# Patient Record
Sex: FEMALE | Race: WHITE | NOT HISPANIC OR LATINO | Employment: FULL TIME | URBAN - METROPOLITAN AREA
[De-identification: names, ages, dates, MRNs, and addresses within clinical notes are randomized per-mention and may not be internally consistent; named-entity substitution may affect disease eponyms.]

---

## 2018-11-13 ENCOUNTER — OFFICE VISIT (OUTPATIENT)
Dept: URGENT CARE | Facility: CLINIC | Age: 33
End: 2018-11-13

## 2018-11-13 VITALS
HEIGHT: 66 IN | DIASTOLIC BLOOD PRESSURE: 72 MMHG | HEART RATE: 70 BPM | TEMPERATURE: 98 F | OXYGEN SATURATION: 98 % | RESPIRATION RATE: 18 BRPM | SYSTOLIC BLOOD PRESSURE: 115 MMHG | BODY MASS INDEX: 16.88 KG/M2 | WEIGHT: 105 LBS

## 2018-11-13 DIAGNOSIS — F19.10 SUBSTANCE ABUSE: ICD-10-CM

## 2018-11-13 DIAGNOSIS — R31.9 URINARY TRACT INFECTION WITH HEMATURIA, SITE UNSPECIFIED: Primary | ICD-10-CM

## 2018-11-13 DIAGNOSIS — R30.0 DYSURIA: ICD-10-CM

## 2018-11-13 DIAGNOSIS — N39.0 URINARY TRACT INFECTION WITH HEMATURIA, SITE UNSPECIFIED: Primary | ICD-10-CM

## 2018-11-13 LAB
B-HCG UR QL: NEGATIVE
BILIRUB UR QL STRIP: NEGATIVE
CTP QC/QA: YES
GLUCOSE UR QL STRIP: NEGATIVE
KETONES UR QL STRIP: NEGATIVE
LEUKOCYTE ESTERASE UR QL STRIP: POSITIVE
PH, POC UA: 6.5 (ref 5–8)
POC BLOOD, URINE: POSITIVE
POC NITRATES, URINE: NEGATIVE
PROT UR QL STRIP: POSITIVE
SP GR UR STRIP: 1 (ref 1–1.03)
UROBILINOGEN UR STRIP-ACNC: NORMAL (ref 0.1–1.1)

## 2018-11-13 PROCEDURE — 81003 URINALYSIS AUTO W/O SCOPE: CPT | Mod: QW,S$GLB,, | Performed by: NURSE PRACTITIONER

## 2018-11-13 PROCEDURE — 81025 URINE PREGNANCY TEST: CPT | Mod: S$GLB,,, | Performed by: NURSE PRACTITIONER

## 2018-11-13 PROCEDURE — 99204 OFFICE O/P NEW MOD 45 MIN: CPT | Mod: 25,S$GLB,, | Performed by: NURSE PRACTITIONER

## 2018-11-13 RX ORDER — PHENAZOPYRIDINE HYDROCHLORIDE 100 MG/1
100 TABLET, FILM COATED ORAL 3 TIMES DAILY PRN
Qty: 20 TABLET | Refills: 0 | Status: SHIPPED | OUTPATIENT
Start: 2018-11-13 | End: 2019-11-13

## 2018-11-13 RX ORDER — NITROFURANTOIN 25; 75 MG/1; MG/1
100 CAPSULE ORAL 2 TIMES DAILY
Qty: 14 CAPSULE | Refills: 0 | Status: SHIPPED | OUTPATIENT
Start: 2018-11-13 | End: 2018-11-20

## 2018-11-13 NOTE — PATIENT INSTRUCTIONS
UTI   If your condition worsens or fails to improve we recommend that you receive another evaluation at the ER immediately or contact your PCP to discuss your concerns or return here. You must understand that you've received an urgent care treatment only and that you may be released before all your medical problems are known or treated. You the patient will arrange for followup care as instructed.   If you were prescribed antibiotics, please take them to full completion.    If you had cultures done it will take 3-5 days to result. We will call you with the result.   If you are are female and on BCP use additional methods to prevent pregnancy while on the antibiotics and for one cycle after.   Cranberry juice may help. Get the 100% cranberry juice and mix 4 oz of juice with 4 oz of water and drink this 8 oz glass of liquid once a day.     Urinary Tract Infections in Women    Urinary tract infections (UTIs) are most often caused by bacteria (germs). These bacteria enter the urinary tract. The bacteria may come from outside the body. Or they may travel from the skin outside the rectum or vagina into the urethra. Female anatomy makes it easy for bacteria from the bowel to enter a womans urinary tract, which is the most common source of UTI. This means women develop UTIs more often than men. Pain in or around the urinary tract is a common UTI symptom. But the only way to know for sure if you have a UTI for the healthcare provider to test your urine. The two tests that may be done are the urinalysis and urine culture.  Types of UTIs  · Cystitis: A bladder infection (cystitis) is the most common UTI in women. You may have urgent or frequent urination. You may also have pain, burning when you urinate, and bloody urine.  · Urethritis: This is an inflamed urethra, which is the tube that carries urine from the bladder to outside the body. You may have lower  stomach or back pain. You may also have urgent or frequent urination.  · Pyelonephritis: This is a kidney infection. If not treated, it can be serious and damage your kidneys. In severe cases, you may be hospitalized. You may have a fever and lower back pain.  Medicines to treat a UTI  Most UTIs are treated with antibiotics. These kill the bacteria. The length of time you need to take them depends on the type of infection. It may be as short as 3 days. If you have repeated UTIs, a low-dose antibiotic may be needed for several months. Take antibiotics exactly as directed. Dont stop taking them until all of the medicine is gone. If you stop taking the antibiotic too soon, the infection may not go away, and you may develop a resistance to the antibiotic. This can make it much harder to treat.  Lifestyle changes to treat and prevent UTIs  The lifestyle changes below will help get rid of your UTI. They may also help prevent future UTIs.  · Drink plenty of fluids. This includes water, juice, or other caffeine-free drinks. Fluids help flush bacteria out of your body.  · Empty your bladder. Always empty your bladder when you feel the urge to urinate. And always urinate before going to sleep. Urine that stays in your bladder can lead to infection. Try to urinate before and after sex as well.  · Practice good personal hygiene. Wipe yourself from front to back after using the toilet. This helps keep bacteria from getting into the urethra.  · Use condoms during sex. These help prevent UTIs caused by sexually transmitted bacteria. Also, avoid using spermicides during sex. These can increase the risk of UTIs. Choose other forms of birth control instead. For women who tend to get UTIs after sex, a low-dose of a preventive antibiotic may be used. Be sure to discuss this option with your healthcare provider.  · Follow up with your healthcare provider as directed. He or she may test to make sure the infection has cleared. If needed,  more treatment may be started.  Date Last Reviewed: 1/1/2017  © 0779-6355 [employer]. [address] [place] [address], [place] [address]. All rights reserved. This information is not intended as a substitute for professional medical care. Always follow your healthcare professional's instructions.

## 2018-11-13 NOTE — PROGRESS NOTES
"Subjective:       Patient ID: Nadege Deng is a 33 y.o. female.    Vitals:  height is 5' 6" (1.676 m) and weight is 47.6 kg (105 lb). Her temperature is 97.7 °F (36.5 °C). Her blood pressure is 115/72 and her pulse is 70. Her respiration is 18 and oxygen saturation is 98%.     Chief Complaint: Urinary Tract Infection    Patient lives [place], she woke up yesterday having "exterior vaginal pain when urinating ". Patient states to have blood in urine that does not look like menstrual blood. she admits to using fathers protonix  for acid reflux and is a heroine user. She stated to have taken some AZO yesterday for the pain and it seems to have relieved some pain.      Urinary Tract Infection    This is a new problem. The current episode started yesterday. There has been no fever. She is sexually active. Associated symptoms include frequency, hesitancy, nausea and urgency. Pertinent negatives include no behavior changes, chills, discharge, flank pain, hematuria, possible pregnancy, sweats, vomiting, weight loss, bubble bath use, constipation, rash or withholding. Treatments tried: azo.     Review of Systems   Constitution: Negative for chills, fever and weight loss.   Skin: Negative for itching and rash.   Musculoskeletal: Negative for back pain.   Gastrointestinal: Positive for nausea. Negative for abdominal pain, constipation and vomiting.   Genitourinary: Positive for frequency, hesitancy and urgency. Negative for dysuria, flank pain, genital sores, hematuria, missed menses and non-menstrual bleeding.   All other systems reviewed and are negative.      Objective:      Physical Exam   Constitutional: She is oriented to person, place, and time. Vital signs are normal. She appears well-developed and well-nourished.  Non-toxic appearance. She does not have a sickly appearance. She does not appear ill. No distress.   HENT:   Head: Normocephalic and atraumatic.   Right Ear: Hearing, tympanic membrane, external ear and " ear canal normal.   Left Ear: Hearing, tympanic membrane, external ear and ear canal normal.   Nose: Nose normal. No mucosal edema, rhinorrhea or nasal deformity. No epistaxis.   Mouth/Throat: Uvula is midline, oropharynx is clear and moist and mucous membranes are normal. Tonsils are 1+ on the right. Tonsils are 1+ on the left. No tonsillar exudate.   Eyes: Conjunctivae, EOM and lids are normal. Pupils are equal, round, and reactive to light.   Neck: Trachea normal, normal range of motion, full passive range of motion without pain and phonation normal. Neck supple.   Cardiovascular: Normal rate, regular rhythm, S1 normal, S2 normal, normal heart sounds and normal pulses.   Pulmonary/Chest: Effort normal and breath sounds normal. No respiratory distress. She has no decreased breath sounds. She has no wheezes. She has no rhonchi. She has no rales.   Abdominal: Soft. Normal appearance and bowel sounds are normal. She exhibits no distension, no abdominal bruit, no pulsatile midline mass and no mass. There is no hepatosplenomegaly. There is no tenderness. There is no rigidity, no rebound, no guarding and no CVA tenderness.   Musculoskeletal: Normal range of motion. She exhibits no edema.   Lymphadenopathy:     She has no cervical adenopathy.   Neurological: She is alert and oriented to person, place, and time. She has normal strength.   Skin: Skin is warm, dry and intact. No rash noted. She is not diaphoretic. No pallor.   Psychiatric: She has a normal mood and affect. Her speech is normal and behavior is normal. Judgment and thought content normal. Cognition and memory are normal.   Nursing note and vitals reviewed.      Results for orders placed or performed in visit on 11/13/18   POCT Urinalysis, Dipstick, Automated, W/O Scope   Result Value Ref Range    POC Blood, Urine Positive (A) Negative    POC Bilirubin, Urine Negative Negative    POC Urobilinogen, Urine Normal 0.1 - 1.1    POC Ketones, Urine Negative Negative     POC Protein, Urine Positive (A) Negative    POC Nitrates, Urine Negative Negative    POC Glucose, Urine Negative Negative    pH, UA 6.5 5 - 8    POC Specific Gravity, Urine 1.005 1.003 - 1.029    POC Leukocytes, Urine Positive (A) Negative   POCT urine pregnancy   Result Value Ref Range    POC Preg Test, Ur Negative Negative     Acceptable Yes       Assessment:       1. Urinary tract infection with hematuria, site unspecified    2. Dysuria    3. Substance abuse        Plan:       Patient declined referral for substance abuse support.    Urinary tract infection with hematuria, site unspecified  -     nitrofurantoin, macrocrystal-monohydrate, (MACROBID) 100 MG capsule; Take 1 capsule (100 mg total) by mouth 2 (two) times daily. for 7 days  Dispense: 14 capsule; Refill: 0    Dysuria  -     POCT Urinalysis, Dipstick, Automated, W/O Scope  -     POCT urine pregnancy  -     phenazopyridine (PYRIDIUM) 100 MG tablet; Take 1 tablet (100 mg total) by mouth 3 (three) times daily as needed for Pain.  Dispense: 20 tablet; Refill: 0    Substance abuse      Patient Instructions                                                                       UTI   If your condition worsens or fails to improve we recommend that you receive another evaluation at the ER immediately or contact your PCP to discuss your concerns or return here. You must understand that you've received an urgent care treatment only and that you may be released before all your medical problems are known or treated. You the patient will arrange for followup care as instructed.   If you were prescribed antibiotics, please take them to full completion.    If you had cultures done it will take 3-5 days to result. We will call you with the result.   If you are are female and on BCP use additional methods to prevent pregnancy while on the antibiotics and for one cycle after.   Cranberry juice may help. Get the 100% cranberry juice and mix 4 oz of juice  with 4 oz of water and drink this 8 oz glass of liquid once a day.     Urinary Tract Infections in Women    Urinary tract infections (UTIs) are most often caused by bacteria (germs). These bacteria enter the urinary tract. The bacteria may come from outside the body. Or they may travel from the skin outside the rectum or vagina into the urethra. Female anatomy makes it easy for bacteria from the bowel to enter a womans urinary tract, which is the most common source of UTI. This means women develop UTIs more often than men. Pain in or around the urinary tract is a common UTI symptom. But the only way to know for sure if you have a UTI for the healthcare provider to test your urine. The two tests that may be done are the urinalysis and urine culture.  Types of UTIs  · Cystitis: A bladder infection (cystitis) is the most common UTI in women. You may have urgent or frequent urination. You may also have pain, burning when you urinate, and bloody urine.  · Urethritis: This is an inflamed urethra, which is the tube that carries urine from the bladder to outside the body. You may have lower stomach or back pain. You may also have urgent or frequent urination.  · Pyelonephritis: This is a kidney infection. If not treated, it can be serious and damage your kidneys. In severe cases, you may be hospitalized. You may have a fever and lower back pain.  Medicines to treat a UTI  Most UTIs are treated with antibiotics. These kill the bacteria. The length of time you need to take them depends on the type of infection. It may be as short as 3 days. If you have repeated UTIs, a low-dose antibiotic may be needed for several months. Take antibiotics exactly as directed. Dont stop taking them until all of the medicine is gone. If you stop taking the antibiotic too soon, the infection may not go away, and you may develop a resistance to the antibiotic. This can make it much harder to treat.  Lifestyle changes to treat and prevent  UTIs  The lifestyle changes below will help get rid of your UTI. They may also help prevent future UTIs.  · Drink plenty of fluids. This includes water, juice, or other caffeine-free drinks. Fluids help flush bacteria out of your body.  · Empty your bladder. Always empty your bladder when you feel the urge to urinate. And always urinate before going to sleep. Urine that stays in your bladder can lead to infection. Try to urinate before and after sex as well.  · Practice good personal hygiene. Wipe yourself from front to back after using the toilet. This helps keep bacteria from getting into the urethra.  · Use condoms during sex. These help prevent UTIs caused by sexually transmitted bacteria. Also, avoid using spermicides during sex. These can increase the risk of UTIs. Choose other forms of birth control instead. For women who tend to get UTIs after sex, a low-dose of a preventive antibiotic may be used. Be sure to discuss this option with your healthcare provider.  · Follow up with your healthcare provider as directed. He or she may test to make sure the infection has cleared. If needed, more treatment may be started.  Date Last Reviewed: 1/1/2017  © 7991-1519 [employer]. [address] [place] [address], [place] [address]. All rights reserved. This information is not intended as a substitute for professional medical care. Always follow your healthcare professional's instructions.

## 2018-11-16 ENCOUNTER — TELEPHONE (OUTPATIENT)
Dept: URGENT CARE | Facility: CLINIC | Age: 33
End: 2018-11-16

## 2019-01-03 ENCOUNTER — OFFICE VISIT (OUTPATIENT)
Dept: URGENT CARE | Facility: CLINIC | Age: 34
End: 2019-01-03

## 2019-01-03 VITALS
TEMPERATURE: 98 F | OXYGEN SATURATION: 99 % | BODY MASS INDEX: 16.66 KG/M2 | SYSTOLIC BLOOD PRESSURE: 133 MMHG | DIASTOLIC BLOOD PRESSURE: 79 MMHG | HEIGHT: 65 IN | WEIGHT: 100 LBS | RESPIRATION RATE: 16 BRPM | HEART RATE: 115 BPM

## 2019-01-03 DIAGNOSIS — M79.601 RIGHT ARM PAIN: Primary | ICD-10-CM

## 2019-01-03 DIAGNOSIS — F19.90 IV DRUG USER: ICD-10-CM

## 2019-01-03 PROCEDURE — 99214 OFFICE O/P EST MOD 30 MIN: CPT | Mod: S$GLB,,, | Performed by: NURSE PRACTITIONER

## 2019-01-03 PROCEDURE — 99214 PR OFFICE/OUTPT VISIT, EST, LEVL IV, 30-39 MIN: ICD-10-PCS | Mod: S$GLB,,, | Performed by: NURSE PRACTITIONER

## 2019-01-03 RX ORDER — CEPHALEXIN 500 MG/1
500 TABLET ORAL 3 TIMES DAILY
Qty: 15 TABLET | Refills: 0 | Status: SHIPPED | OUTPATIENT
Start: 2019-01-03 | End: 2019-01-10

## 2019-01-03 RX ORDER — MUPIROCIN 20 MG/G
OINTMENT TOPICAL
Qty: 22 G | Refills: 1 | Status: SHIPPED | OUTPATIENT
Start: 2019-01-03

## 2019-01-03 NOTE — PROGRESS NOTES
"Subjective:       Patient ID: Nadege Deng is a 33 y.o. female.    Vitals:  height is 5' 5" (1.651 m) and weight is 45.4 kg (100 lb). Her temperature is 98.4 °F (36.9 °C). Her blood pressure is 133/79 and her pulse is 115 (abnormal). Her respiration is 16 and oxygen saturation is 99%.     Chief Complaint: Arm Pain    Patient lives local. Pt stated she uses heroin(needle and syringe) and has developed cellulitis on right arm-2 days ago. Tried topical antibiotic.    Arm Pain    The incident occurred 2 days ago. The pain is present in the right forearm. Pertinent negatives include no chest pain. Treatments tried: topical antibiotic.       Constitution: Negative for chills, fatigue and fever.   HENT: Negative for congestion and sore throat.    Neck: Negative for painful lymph nodes.   Cardiovascular: Negative for chest pain and leg swelling.   Eyes: Negative for double vision and blurred vision.   Respiratory: Negative for cough and shortness of breath.    Gastrointestinal: Negative for nausea, vomiting and diarrhea.   Genitourinary: Negative for dysuria, frequency, urgency and history of kidney stones.   Musculoskeletal: Positive for pain and joint pain. Negative for joint swelling, muscle cramps and muscle ache.   Skin: Negative for color change, pale, rash, erythema and bruising.   Allergic/Immunologic: Negative for seasonal allergies.   Neurological: Negative for dizziness, history of vertigo, light-headedness, passing out and headaches.   Hematologic/Lymphatic: Negative for swollen lymph nodes.   Psychiatric/Behavioral: Negative for nervous/anxious, sleep disturbance and depression. The patient is not nervous/anxious.        Objective:      Physical Exam   Constitutional: She is oriented to person, place, and time. Vital signs are normal. She appears well-developed and well-nourished. She is cooperative.  Non-toxic appearance. She does not have a sickly appearance. She does not appear ill. No distress.   HENT: "   Head: Normocephalic and atraumatic.   Right Ear: Hearing, tympanic membrane, external ear and ear canal normal.   Left Ear: Hearing, tympanic membrane, external ear and ear canal normal.   Nose: Nose normal. No mucosal edema, rhinorrhea or nasal deformity. No epistaxis. Right sinus exhibits no maxillary sinus tenderness and no frontal sinus tenderness. Left sinus exhibits no maxillary sinus tenderness and no frontal sinus tenderness.   Mouth/Throat: Uvula is midline, oropharynx is clear and moist and mucous membranes are normal. No trismus in the jaw. Normal dentition. No uvula swelling. No posterior oropharyngeal erythema. Tonsils are 1+ on the right. Tonsils are 1+ on the left. No tonsillar exudate.   Eyes: Conjunctivae, EOM and lids are normal. Pupils are equal, round, and reactive to light. Right eye exhibits no discharge. Left eye exhibits no discharge. No scleral icterus.   Sclera clear bilat   Neck: Trachea normal, normal range of motion, full passive range of motion without pain and phonation normal. Neck supple.   Cardiovascular: Normal rate, regular rhythm, S1 normal, S2 normal, normal heart sounds, intact distal pulses and normal pulses.   Pulmonary/Chest: Effort normal and breath sounds normal. No respiratory distress. She has no decreased breath sounds. She has no wheezes. She has no rhonchi. She has no rales.   Abdominal: Soft. Normal appearance and bowel sounds are normal. She exhibits no distension, no pulsatile midline mass and no mass. There is no tenderness.   Musculoskeletal: Normal range of motion. She exhibits no edema or deformity.   Lymphadenopathy:     She has no cervical adenopathy.   Neurological: She is alert and oriented to person, place, and time. She exhibits normal muscle tone. Coordination normal.   Skin: Skin is warm, dry and intact. No abrasion, no bruising, no burn, no ecchymosis, no laceration, no lesion, no petechiae and no rash noted. She is not diaphoretic. No erythema. No  pallor.   There are bilateral pin point scabbed areas of irritation noted and consistent with report of IV Drug Use.  There is no redness, swelling or firmness.  Affected area is tender to touch but patient has Full ROM.  See attached pictures.    Psychiatric: Her speech is normal and behavior is normal. Judgment and thought content normal. Her mood appears anxious. Cognition and memory are normal.   Nursing note and vitals reviewed.      Assessment:       1. Right arm pain    2. IV drug user        Plan:       33 year old female presents with right arm pain secondary to IV Drug use (Heroin Reported).  Patient very adamant about receiving antibiotics for cellulitis..  Discussed with patient the recommendations for an xray and tetanus if it was not up to date.  Patient declined the xray and stated her tetanus was UTD.  Also discussed with patient that her arm did not appear to have a cellulitic appearance at this time thus did not require any antibiotics.  Again, reiterated the recommendations for an xray which she again declined.       Patient informed that antibiotics were not recommended but that we would provide her with a course of antibiotics and topical ointment at today's visit.  Patient offered a referral for drug abuse treatment - she declined stating she would go to [facility].  Patient provided a list of local treatment facilities for follow-up.  She verbalized understanding and agrees with plan of care.     Discussed this case with Dr. Tani Tee who agreed with the above plan of care.    Right arm pain    IV drug user    Other orders  -     cephalexin (KEFTAB) 500 mg tablet; Take 1 tablet (500 mg total) by mouth 3 (three) times daily. for 7 days  Dispense: 15 tablet; Refill: 0  -     mupirocin (BACTROBAN) 2 % ointment; Apply to affected area 3 times daily  Dispense: 22 g; Refill: 1        Patient DECLINED RIGHT ARM XRAY.  Reports last Tetanus was a year ago.    Patient Instructions      General Referral to Local Substance Abuse Facilities:  - Replaced by [facility]:  [address] [facility]  [phone]    -  [facility]:    [address] [facility]   [phone]  -  [facility]:  [address] [facility]  [phone]  Please call these above facilities to schedule your follow-up appointment.    Please go to the Emergency Department for any concerns or worsening of condition.  Please follow up with your primary care doctor or specialist in the next 48-72hrs as needed.    If you  smoke, please stop smoking.      Alcohol Addiction  Are you addicted to alcohol?    You may be addicted to alcohol if your drinking harms yourself or others or leads to other problems with your daily life.  The medical term for this is alcohol use disorder. Your healthcare provider may diagnose you with this disorder if you have at least two of the following problems within the span of a year:  · You drink alcohol in larger amounts or for a longer period than you intended.  · You frequently want to cut down or control alcohol use, or have frequently failed efforts to do so.  · You spend a lot of time getting alcohol, using alcohol, or recovering from alcohol use.  · You crave or have a strong desire or urge to drink alcohol.  · Ongoing alcohol use makes it hard for you to be responsible at work, school, or home.  · You continue to use alcohol even though you have had problems in relationships or social settings because of it.  · You give up or miss important social, work, or recreational activities because of alcohol use.  · You drink alcohol in situations in which it is physically unsafe, such as drinking then driving while intoxicated.  · You continue to drink alcohol despite knowing it has caused physical or emotional problems.  · You need more and more alcohol to get the same effects.  · You hide how much alcohol you drink from family and friends.  · You have withdrawal symptoms or use alcohol to  avoid withdrawal symptoms.  Date Last Reviewed: 2/1/2017 © 2000-2017 [employer]. [address] [place] [address], [place] [address]. All rights reserved. This information is not intended as a substitute for professional medical care. Always follow your healthcare professional's instructions.        Addiction: Your Treatment Options  No single treatment for addiction works for everyone. The treatment that's best for you can depend on many factors. For many people, treatment may be a combination of medicine, behavior change, therapy, lifestyle changes, and support.  Medicines  Medicines can help with withdrawal symptoms. They can also reduce cravings for the addictive substance. They can blunt its feel-good effects. For example:  · Methadone, buprenorphine, and naltrexone are used for heroin and other opioid addiction.  · Acamprosate, disulfiram, and naltrexone are used for treating alcohol addiction.  · Bupropion, varenicline, or nicotine replacement therapy can help with nicotine addiction.  These medicines have proved to be quite helpful for people trying to overcome an addiction.    Behavior change treatment  · Motivational Interviewing. This is a type of counseling that encourages you to change your behavior. The goal is to explore and resolve any mixed feelings you have about quitting drug or alcohol use. The therapist helps you figure out and focus on your personal reasons for wanting to change.    · Cognitive behavioral therapy (CBT). In this therapy, you figure out your problem behaviors. And you learn ways to change those behaviors. For example, if anger or stress makes you want to drink, a therapist can help you learn healthy ways to manage those feelings.  · Community reinforcement approach (CRA). This therapy uses vouchers help you follow a drug- or alcohol-free lifestyle.  With each clean urine sample, you get a voucher to use for a reward.  This helps you stay drug- or alcohol-free while you  learn new life skills.  · Community reinforcement and family training (CRAFT). This therapy counsels and trains your family. The therapist teaches them how to motivate you to seek or continue treatment. This therapy also helps your family recognize family situations that may encourage you to drink or use drugs.   · [place] support groups. These groups are run voluntarily by non-health care professional people. Their purpose is to support each other emotionally and socially by sharing their experiences with substance abuse and mentoring others through the recovery process. Many of these groups are based on the 12-step recovery model, and have sessions available for every type of addiction (for example, AA or Alcoholics Anonymous; NA or Narcotics Anonymous).  · Individualized drug counseling (IDC). This commonly practiced form of therapy typically incorporate the disease model of addiction and the spiritual dimension of recovery while also focusing on behavioral change through participation in 12-step programs.  Date Last Reviewed: 2/1/2017 © 2000-2017 [employer]. [address] [place] [address], [place] [address]. All rights reserved. This information is not intended as a substitute for professional medical care. Always follow your healthcare professional's instructions.

## 2019-01-03 NOTE — PATIENT INSTRUCTIONS
General Referral to Local Substance Abuse Facilities:  - [facility]:  [address] [facility]  [phone]    -  [facility]:    [address] [facility]   [phone]  -  [facility]:  [address] [facility]  [phone]  Please call these above facilities to schedule your follow-up appointment.    Please go to the Emergency Department for any concerns or worsening of condition.  Please follow up with your primary care doctor or specialist in the next 48-72hrs as needed.    If you  smoke, please stop smoking.      Alcohol Addiction  Are you addicted to alcohol?    You may be addicted to alcohol if your drinking harms yourself or others or leads to other problems with your daily life.  The medical term for this is alcohol use disorder. Your healthcare provider may diagnose you with this disorder if you have at least two of the following problems within the span of a year:  · You drink alcohol in larger amounts or for a longer period than you intended.  · You frequently want to cut down or control alcohol use, or have frequently failed efforts to do so.  · You spend a lot of time getting alcohol, using alcohol, or recovering from alcohol use.  · You crave or have a strong desire or urge to drink alcohol.  · Ongoing alcohol use makes it hard for you to be responsible at work, school, or home.  · You continue to use alcohol even though you have had problems in relationships or social settings because of it.  · You give up or miss important social, work, or recreational activities because of alcohol use.  · You drink alcohol in situations in which it is physically unsafe, such as drinking then driving while intoxicated.  · You continue to drink alcohol despite knowing it has caused physical or emotional problems.  · You need more and more alcohol to get the same effects.  · You hide how much alcohol you drink from family and friends.  · You have withdrawal symptoms or use alcohol to  avoid withdrawal symptoms.  Date Last Reviewed: 2/1/2017 © 2000-2017 [employer]. [address] [place] [address], [place] [address]. All rights reserved. This information is not intended as a substitute for professional medical care. Always follow your healthcare professional's instructions.        Addiction: Your Treatment Options  No single treatment for addiction works for everyone. The treatment that's best for you can depend on many factors. For many people, treatment may be a combination of medicine, behavior change, therapy, lifestyle changes, and support.  Medicines  Medicines can help with withdrawal symptoms. They can also reduce cravings for the addictive substance. They can blunt its feel-good effects. For example:  · Methadone, buprenorphine, and naltrexone are used for heroin and other opioid addiction.  · Acamprosate, disulfiram, and naltrexone are used for treating alcohol addiction.  · Bupropion, varenicline, or nicotine replacement therapy can help with nicotine addiction.  These medicines have proved to be quite helpful for people trying to overcome an addiction.    Behavior change treatment  · Motivational Interviewing. This is a type of counseling that encourages you to change your behavior. The goal is to explore and resolve any mixed feelings you have about quitting drug or alcohol use. The therapist helps you figure out and focus on your personal reasons for wanting to change.    · Cognitive behavioral therapy (CBT). In this therapy, you figure out your problem behaviors. And you learn ways to change those behaviors. For example, if anger or stress makes you want to drink, a therapist can help you learn healthy ways to manage those feelings.  · Community reinforcement approach (CRA). This therapy uses vouchers help you follow a drug- or alcohol-free lifestyle.  With each clean urine sample, you get a voucher to use for a reward.  This helps you stay drug- or alcohol-free while you  learn new life skills.  · Community reinforcement and family training (CRAFT). This therapy counsels and trains your family. The therapist teaches them how to motivate you to seek or continue treatment. This therapy also helps your family recognize family situations that may encourage you to drink or use drugs.   · [place] support groups. These groups are run voluntarily by non-health care professional people. Their purpose is to support each other emotionally and socially by sharing their experiences with substance abuse and mentoring others through the recovery process. Many of these groups are based on the 12-step recovery model, and have sessions available for every type of addiction (for example, AA or Alcoholics Anonymous; NA or Narcotics Anonymous).  · Individualized drug counseling (IDC). This commonly practiced form of therapy typically incorporate the disease model of addiction and the spiritual dimension of recovery while also focusing on behavioral change through participation in 12-step programs.  Date Last Reviewed: 2/1/2017 © 2000-2017 [employer]. [address] [place] [address], [place] [address]. All rights reserved. This information is not intended as a substitute for professional medical care. Always follow your healthcare professional's instructions.

## 2019-01-06 ENCOUNTER — TELEPHONE (OUTPATIENT)
Dept: URGENT CARE | Facility: CLINIC | Age: 34
End: 2019-01-06

## 2023-05-05 ENCOUNTER — HOSPITAL ENCOUNTER (EMERGENCY)
Facility: OTHER | Age: 38
Discharge: HOME | End: 2023-05-05
Attending: EMERGENCY MEDICINE

## 2023-05-05 VITALS
TEMPERATURE: 98 F | OXYGEN SATURATION: 99 % | RESPIRATION RATE: 18 BRPM | HEIGHT: 65 IN | WEIGHT: 100 LBS | HEART RATE: 123 BPM | SYSTOLIC BLOOD PRESSURE: 149 MMHG | DIASTOLIC BLOOD PRESSURE: 84 MMHG | BODY MASS INDEX: 16.66 KG/M2

## 2023-05-05 DIAGNOSIS — R21 RASH AND NONSPECIFIC SKIN ERUPTION: Primary | ICD-10-CM

## 2023-05-05 LAB
B-HCG UR QL: NEGATIVE
BACTERIA #/AREA URNS HPF: ABNORMAL /HPF
BACTERIA GENITAL QL WET PREP: ABNORMAL
BILIRUB UR QL STRIP: NEGATIVE
C TRACH DNA SPEC QL NAA+PROBE: NOT DETECTED
CAOX CRY URNS QL MICRO: ABNORMAL
CLARITY UR: CLEAR
CLUE CELLS VAG QL WET PREP: ABNORMAL
COLOR UR: YELLOW
CTP QC/QA: YES
FILAMENT FUNGI VAG WET PREP-#/AREA: ABNORMAL
GLUCOSE UR QL STRIP: NEGATIVE
HGB UR QL STRIP: ABNORMAL
KETONES UR QL STRIP: NEGATIVE
LEUKOCYTE ESTERASE UR QL STRIP: ABNORMAL
MICROSCOPIC COMMENT: ABNORMAL
N GONORRHOEA DNA SPEC QL NAA+PROBE: NOT DETECTED
NITRITE UR QL STRIP: NEGATIVE
PH UR STRIP: 6 [PH] (ref 5–8)
PROT UR QL STRIP: ABNORMAL
RBC #/AREA URNS HPF: 21 /HPF (ref 0–4)
SP GR UR STRIP: 1.02 (ref 1–1.03)
SPECIMEN SOURCE: ABNORMAL
SQUAMOUS #/AREA URNS HPF: 13 /HPF
T VAGINALIS GENITAL QL WET PREP: ABNORMAL
URN SPEC COLLECT METH UR: ABNORMAL
UROBILINOGEN UR STRIP-ACNC: NEGATIVE EU/DL
WBC #/AREA URNS HPF: 3 /HPF (ref 0–5)
WBC #/AREA VAG WET PREP: ABNORMAL
YEAST GENITAL QL WET PREP: ABNORMAL

## 2023-05-05 PROCEDURE — 99283 EMERGENCY DEPT VISIT LOW MDM: CPT

## 2023-05-05 PROCEDURE — 81025 URINE PREGNANCY TEST: CPT | Performed by: EMERGENCY MEDICINE

## 2023-05-05 PROCEDURE — 87210 SMEAR WET MOUNT SALINE/INK: CPT | Performed by: EMERGENCY MEDICINE

## 2023-05-05 PROCEDURE — 87591 N.GONORRHOEAE DNA AMP PROB: CPT | Performed by: EMERGENCY MEDICINE

## 2023-05-05 PROCEDURE — 81000 URINALYSIS NONAUTO W/SCOPE: CPT | Performed by: EMERGENCY MEDICINE

## 2023-05-05 PROCEDURE — 25000003 PHARM REV CODE 250: Performed by: EMERGENCY MEDICINE

## 2023-05-05 RX ORDER — DOXYCYCLINE HYCLATE 100 MG
100 TABLET ORAL
Status: COMPLETED | OUTPATIENT
Start: 2023-05-05 | End: 2023-05-05

## 2023-05-05 RX ORDER — DIPHENHYDRAMINE HCL 25 MG
25 CAPSULE ORAL
Status: COMPLETED | OUTPATIENT
Start: 2023-05-05 | End: 2023-05-05

## 2023-05-05 RX ORDER — DOXYCYCLINE 100 MG/1
100 CAPSULE ORAL 2 TIMES DAILY
Qty: 10 CAPSULE | Refills: 0 | Status: SHIPPED | OUTPATIENT
Start: 2023-05-05 | End: 2023-05-15

## 2023-05-05 RX ORDER — ONDANSETRON 4 MG/1
4 TABLET, ORALLY DISINTEGRATING ORAL
Status: COMPLETED | OUTPATIENT
Start: 2023-05-05 | End: 2023-05-05

## 2023-05-05 RX ADMIN — ONDANSETRON 4 MG: 4 TABLET, ORALLY DISINTEGRATING ORAL at 05:05

## 2023-05-05 RX ADMIN — DIPHENHYDRAMINE HYDROCHLORIDE 25 MG: 25 CAPSULE ORAL at 04:05

## 2023-05-05 RX ADMIN — DOXYCYCLINE HYCLATE 100 MG: 100 TABLET, COATED ORAL at 04:05

## 2023-05-05 NOTE — ED TRIAGE NOTES
"Patient to ED with c/o " scabies x 3 days " states she has " white things and black mites crawling on her skin and in her vagina " .   "

## 2023-05-05 NOTE — ED PROVIDER NOTES
"Encounter Date: 5/5/2023    SCRIBE #1 NOTE: I, Armand Chamberlainlaylafredy, am scribing for, and in the presence of,  Byron Tatum.     History     Chief Complaint   Patient presents with    Scabies     Patient to ED with c/o " scabies x 3 days " states she has " white things and black mites crawling on her skin and in her vagina "      Time seen by provider: 3:50 AM    Nadege Deng is a 37 y.o. female, with a PMHx of heroin and cocaine drug abuse, who presents to the ED with worsening bug crawling sensations for the past two weeks. Patient describes feeling as if she's being eaten alive and notes the presence of possible rashes across her body with the presence of discharge from abscess on her R arm. She also notes vaginal inflammation and discharge along with the feeling of "something inside my vagina".  Patient reports unknown white substance in urine and the passing of abnormal rice-pellet/circular shaped feces. She notes taking her Permethrin prescription from PCP with no relief. Patient also reports taking epsom salt baths and washing skin with alcohol and Rukhsana soap with minimal relief. Patient believes she saw fleas on her dog and is concerned she has flea eggs. She reports daily use of cocaine and heroin in addition as well as having insomnia recently. Patient denies any recent travel out of the country. This is the extent of the patient's complaints today in the Emergency Department.          The history is provided by the patient.   Review of patient's allergies indicates:  No Known Allergies  Past Medical History:   Diagnosis Date    Acid reflux      History reviewed. No pertinent surgical history.  Family History   Problem Relation Age of Onset    No Known Problems Mother     Stroke Father     Heart attack Father     Diabetes Father      Social History     Tobacco Use    Smoking status: Every Day     Types: Vaping w/o nicotine    Smokeless tobacco: Never   Substance Use Topics    Alcohol use: Yes    Drug " use: Yes     Types: Marijuana, Heroin     Review of Systems   Constitutional:  Negative for fever.   HENT:  Negative for congestion.    Eyes:  Negative for redness.   Respiratory:  Negative for shortness of breath.    Cardiovascular:  Negative for chest pain.   Gastrointestinal:  Negative for abdominal pain.   Genitourinary:  Positive for vaginal discharge. Negative for dysuria.   Skin:  Positive for rash.   Neurological:  Negative for headaches.        Bug-crawling sensations.   Psychiatric/Behavioral:  Positive for agitation. Negative for confusion.      Physical Exam     Initial Vitals [05/05/23 0244]   BP Pulse Resp Temp SpO2   (!) 149/84 (!) 123 18 98 °F (36.7 °C) 99 %      MAP       --         Physical Exam    Nursing note and vitals reviewed.  Constitutional: She is not diaphoretic. She appears distressed.   HENT:   Head: Normocephalic and atraumatic.   Eyes: Conjunctivae are normal. No scleral icterus.   Neck: Neck supple.   Cardiovascular:  Normal rate, regular rhythm, normal heart sounds and intact distal pulses.           No murmur heard.  Pulmonary/Chest: Breath sounds normal. No respiratory distress. She has no wheezes. She has no rhonchi. She has no rales.   Musculoskeletal:         General: No edema. Normal range of motion.      Cervical back: Neck supple.     Neurological: She is alert and oriented to person, place, and time.   Skin: Skin is warm and dry. Abscess noted. There is erythema.   Right antecubital fossa with area of induration consistent with healing abscess. Areas of excoriations and of erythema with no discrete lesions or focal tenderness.   Psychiatric:   Anxious affect.       ED Course   Procedures  Labs Reviewed   VAGINAL SCREEN - Abnormal; Notable for the following components:       Result Value    Clue Cells Few (*)     Bacteria - Vaginal Screen Few (*)     All other components within normal limits    Narrative:     Release to patient->Immediate   URINALYSIS, REFLEX TO URINE CULTURE  - Abnormal; Notable for the following components:    Protein, UA Trace (*)     Occult Blood UA 2+ (*)     Leukocytes, UA 2+ (*)     All other components within normal limits    Narrative:     Specimen Source->Urine   URINALYSIS MICROSCOPIC - Abnormal; Notable for the following components:    RBC, UA 21 (*)     All other components within normal limits    Narrative:     Specimen Source->Urine   C. TRACHOMATIS/N. GONORRHOEAE BY AMP DNA   POCT URINE PREGNANCY          Imaging Results    None          Medications   diphenhydrAMINE capsule 25 mg (25 mg Oral Given 5/5/23 7216)   doxycycline tablet 100 mg (100 mg Oral Given 5/5/23 7609)   ondansetron disintegrating tablet 4 mg (4 mg Oral Given 5/5/23 6145)     Medical Decision Making:   History:   Old Medical Records: I decided to obtain old medical records.  Initial Assessment:       37-year-old female with history of IV drug abuse presents for evaluation of skin rash and sensation of insects crawling under her skin.  This started about 2 weeks ago, she thought it was from her dog having fleas, but has worsened in the past few days, stating that she is noticed whitish things on her bed and areas of redness on her skin.  She feels something moving under her scan and even in her vagina, and also noticed something weird in her stool as well.  She called her PCP and he prescribed permethrin which she applied twice with no improvement.  She denies any fevers or history of similar previous episodes, no recent travel out of the country or other known exposures.  She also had drainage from a right arm abscess recently.  On exam patient appears anxious with tachycardia, likely from recent drug use.  She has healing right antecubital abscess with no indication for I and D, and areas of faint erythema and excoriations over her extremities, but no confluent erythema or tenderness to suggest active infection.  Patient showed me her urine, pictures of her stool and vagina, and areas of  her skin and scalp but no sign of active insect or parasitic infestation.  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  I had extensive discussion about this with patient and she eventually expressed some understanding of this likely diagnosis.  Vaginal screen checked with clue cells only, but patient with no significant discharge to warrant empiric treatment for BV.  UA with some microscopic hematuria but rare bacteria, not consistent with UTI.  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  Patient counseled on drug abuse and cessation, referred to Dermatology for definitive diagnosis and understands return precautions for any other concerns.      Clinical Tests:   Lab Tests: Ordered and Reviewed        Scribe Attestation:   Scribe #1: I performed the above scribed service and the documentation accurately describes the services I performed. I attest to the accuracy of the note.  I reviewed the patient presentation with the mid-level provider and agree with ED workup and management. I was available to personally examine the patient if requested.                      Clinical Impression:   Final diagnoses:  [R21] Rash and nonspecific skin eruption (Primary)        ED Disposition Condition    Discharge Stable          ED Prescriptions       Medication Sig Dispense Start Date End Date Auth. Provider    doxycycline (VIBRAMYCIN) 100 MG Cap Take 1 capsule (100 mg total) by mouth 2 (two) times daily. for 10 days 10 capsule 5/5/2023 5/15/2023 Byron Tatum MD          Follow-up Information       Follow up With Specialties Details Why Contact [facility] DERMATOLOG[facility] Dermatology Schedule an appointment as soon as possible for a visit in 3 days  [address] [place] [id number]  [phone]             Byron Tatum MD  05/05/23 0906       Byron ANNE  MD Deepti  05/05/23 0908